# Patient Record
(demographics unavailable — no encounter records)

---

## 2024-10-16 NOTE — HISTORY OF PRESENT ILLNESS
[FreeTextEntry1] : Patient came for the second consultation regarding her spider veins.  Venous ultrasound was negative for venous insufficiency.  Spider veins are asymptomatic.

## 2024-10-16 NOTE — PHYSICAL EXAM
[Normal Breath Sounds] : Normal breath sounds [2+] : left 2+ [Ankle Swelling (On Exam)] : not present [Varicose Veins Of Lower Extremities] : not present [] : not present [No Rash or Lesion] : No rash or lesion [de-identified] : Multiple blue and red spiders bilaterally.

## 2024-10-16 NOTE — ASSESSMENT
[FreeTextEntry1] : Patient will start sclerotherapy.  Procedure was discussed explaining risks and benefits involved.

## 2024-10-23 NOTE — PROCEDURE
[FreeTextEntry1] : Sclerotherapy left leg [FreeTextEntry2] : Spider veins left leg [FreeTextEntry3] : With the patient on the table skin was disinfected with alcohol.  1% polidocanol was used for the sclerotherapy.  Total of 4 mL of sclerosing agent was injected below the knee.  At the end of procedure, the compression dressing was applied.

## 2024-10-30 NOTE — PROCEDURE
[FreeTextEntry1] : Sclerotherapy of the spider veins right leg [FreeTextEntry2] : Spider veins right leg [FreeTextEntry3] : 1% polidocanol was used to inject spider veins right leg below the knee.  2 mL of sclerosing agent was injected.  There was no reaction.  At the end of procedure leg was wrapped with compression dressing.

## 2024-11-06 NOTE — PHYSICAL EXAM
[Normal Breath Sounds] : Normal breath sounds [Normal Heart Sounds] : normal heart sounds [Calm] : calm [de-identified] : There is no evidence of blood entrapment.  There is some bruising of the pretibial area.

## 2025-01-08 NOTE — PROCEDURE
[FreeTextEntry1] : Sclerotherapy left leg below the knee [FreeTextEntry2] : Spider veins and reticular veins left leg [FreeTextEntry3] : With the patient on the OR table.  Alcohol prep was used.  1% polidocanol was injected into reticular and spider veins.  4 mL of sclerosing agent was used.  Patient tolerated procedure well.

## 2025-01-10 NOTE — PROCEDURE
[FreeTextEntry1] : Laser therapy spider veins right leg [FreeTextEntry2] : Spider veins right leg [FreeTextEntry3] : Fotona  laser was used to treat spider veins right leg.  Patient tolerated procedure well.

## 2025-01-17 NOTE — PROCEDURE
[FreeTextEntry1] : Laser therapy of the spider veins left leg [FreeTextEntry2] : Spider veins left leg [FreeTextEntry3] : Using Fotona laser ablation of the spider veins was done.  Patient tolerated procedure well.

## 2025-01-22 NOTE — REASON FOR VISIT
[de-identified] : Patient is status post laser therapy of the spider veins both lower extremities.

## 2025-03-05 NOTE — PHYSICAL EXAM
[JVD] : no jugular venous distention  [Normal Breath Sounds] : Normal breath sounds [Normal Heart Sounds] : normal heart sounds [Ankle Swelling (On Exam)] : not present [Varicose Veins Of Lower Extremities] : not present [] : not present [No Rash or Lesion] : No rash or lesion [Calm] : calm [de-identified] : Some spider veins are still present.

## 2025-03-21 NOTE — PROCEDURE
[FreeTextEntry1] : Cosmetic  skin laser treatment of the spider veins [FreeTextEntry2] : Bilateral spider veins [FreeTextEntry3] : Fourth ulna cosmetic laser was used to treat spider veins both lower extremities.  Patient tolerated procedure well.

## 2025-03-26 NOTE — PHYSICAL EXAM
[JVD] : no jugular venous distention  [Normal Breath Sounds] : Normal breath sounds [Ankle Swelling (On Exam)] : not present [Varicose Veins Of Lower Extremities] : not present [] : not present [Calm] : calm [de-identified] : There is no evidence of blood entrapment.

## 2025-04-23 NOTE — HISTORY OF PRESENT ILLNESS
[FreeTextEntry1] : Patient is follow-up visit after sclerotherapy and laser therapy of the spider veins both lower extremities.

## 2025-04-23 NOTE — PHYSICAL EXAM
[Normal Breath Sounds] : Normal breath sounds [Normal Heart Sounds] : normal heart sounds [Ankle Swelling (On Exam)] : not present [Varicose Veins Of Lower Extremities] : not present [No Rash or Lesion] : No rash or lesion [de-identified] : There is significant improvement in amount of spider veins both legs.